# Patient Record
Sex: MALE | Race: WHITE | NOT HISPANIC OR LATINO
[De-identification: names, ages, dates, MRNs, and addresses within clinical notes are randomized per-mention and may not be internally consistent; named-entity substitution may affect disease eponyms.]

---

## 2021-03-22 PROBLEM — Z00.00 ENCOUNTER FOR PREVENTIVE HEALTH EXAMINATION: Status: ACTIVE | Noted: 2021-03-22

## 2021-03-24 ENCOUNTER — APPOINTMENT (OUTPATIENT)
Dept: COLORECTAL SURGERY | Facility: CLINIC | Age: 26
End: 2021-03-24
Payer: COMMERCIAL

## 2021-03-24 VITALS
HEART RATE: 80 BPM | RESPIRATION RATE: 12 BRPM | SYSTOLIC BLOOD PRESSURE: 120 MMHG | TEMPERATURE: 98.2 F | HEIGHT: 70 IN | BODY MASS INDEX: 24.34 KG/M2 | WEIGHT: 170 LBS | DIASTOLIC BLOOD PRESSURE: 74 MMHG

## 2021-03-24 DIAGNOSIS — Z83.2 FAMILY HISTORY OF DISEASES OF THE BLOOD AND BLOOD-FORMING ORGANS AND CERTAIN DISORDERS INVOLVING THE IMMUNE MECHANISM: ICD-10-CM

## 2021-03-24 DIAGNOSIS — Z87.09 PERSONAL HISTORY OF OTHER DISEASES OF THE RESPIRATORY SYSTEM: ICD-10-CM

## 2021-03-24 DIAGNOSIS — Z72.89 OTHER PROBLEMS RELATED TO LIFESTYLE: ICD-10-CM

## 2021-03-24 PROCEDURE — 99203 OFFICE O/P NEW LOW 30 MIN: CPT

## 2021-03-24 PROCEDURE — 99072 ADDL SUPL MATRL&STAF TM PHE: CPT

## 2021-03-24 NOTE — PHYSICAL EXAM
[Normal Breath Sounds] : Normal breath sounds [Normal Heart Sounds] : normal heart sounds [Normal Rate and Rhythm] : normal rate and rhythm [No Edema] : No edema [Alert] : alert [Oriented to Person] : oriented to person [Oriented to Place] : oriented to place [Oriented to Time] : oriented to time [Calm] : calm [de-identified] : flat +BS NT/ND [de-identified] : NC/AT [de-identified] : +ROM [de-identified] : intact

## 2021-03-24 NOTE — HISTORY OF PRESENT ILLNESS
[FreeTextEntry1] : 26yo WM developed infrequent blood on toilet tissue over past 4-5yrs.  Area would intermittently swell, drain, painful with sitting. \par \par December 2020 pt fell, landing on coccyx. Required evaluation of the area by PCP. At that time he was diagnosed with a pilonidal cyst. Has been seen by general surgeon.\par \par Presents for second opinion re mgmt/tx of pilonidal cyst.\par

## 2021-03-24 NOTE — ASSESSMENT
[FreeTextEntry1] : 25-year-old who presents for consultation regarding a pilonidal cyst.\par \par Over the past 4-5 years the patient has experienced intermittent bleeding seen in his undergarments. Recently he fell and had an increased amount of drainage from that area. He saw a general surgeon and was diagnosed with a pilonidal cyst.\par \par Patient was examined in the prone position. Inspection of the lower back/intergluteal region reveals obvious numerous midline pits. The area is slightly erythematous but there is no evidence of a pilonidal abscess currently. Inspection of the anorectal area is unremarkable. This does not appear to be an anal fistula.\par \par My preferred method of dealing with this certainly as a first attempt at treatment is pilonidal excision and marsupialization and allowance for healing by secondary intention. I generally teach a family member how to  appropriately pack the wound on a daily basis. I explained that it generally takes 4-6 or 5-7 weeks for complete healing.\par \par They will consider my explanation and recommendations and get back to me if they wish to proceed with surgical scheduling.

## 2021-03-24 NOTE — REVIEW OF SYSTEMS
[Negative] : Endocrine [Chest Pain] : no chest pain [Shortness Of Breath] : no shortness of breath [Easy Bleeding] : no tendency for easy bleeding [Easy Bruising] : no tendency for easy bruising [FreeTextEntry7] : constipation. BM every other day. Denies rectal bleeding

## 2021-03-26 ENCOUNTER — TRANSCRIPTION ENCOUNTER (OUTPATIENT)
Age: 26
End: 2021-03-26

## 2021-03-26 ENCOUNTER — OUTPATIENT (OUTPATIENT)
Dept: OUTPATIENT SERVICES | Facility: HOSPITAL | Age: 26
LOS: 1 days | End: 2021-03-26
Payer: COMMERCIAL

## 2021-03-26 VITALS
TEMPERATURE: 99 F | HEART RATE: 72 BPM | DIASTOLIC BLOOD PRESSURE: 72 MMHG | WEIGHT: 171.96 LBS | RESPIRATION RATE: 20 BRPM | SYSTOLIC BLOOD PRESSURE: 102 MMHG | HEIGHT: 69.69 IN | OXYGEN SATURATION: 100 %

## 2021-03-26 DIAGNOSIS — L05.91 PILONIDAL CYST WITHOUT ABSCESS: ICD-10-CM

## 2021-03-26 DIAGNOSIS — Z01.818 ENCOUNTER FOR OTHER PREPROCEDURAL EXAMINATION: ICD-10-CM

## 2021-03-26 DIAGNOSIS — Z98.890 OTHER SPECIFIED POSTPROCEDURAL STATES: Chronic | ICD-10-CM

## 2021-03-26 LAB
HCT VFR BLD CALC: 41.9 % — SIGNIFICANT CHANGE UP (ref 39–50)
HGB BLD-MCNC: 14.2 G/DL — SIGNIFICANT CHANGE UP (ref 13–17)
MCHC RBC-ENTMCNC: 27.7 PG — SIGNIFICANT CHANGE UP (ref 27–34)
MCHC RBC-ENTMCNC: 33.9 GM/DL — SIGNIFICANT CHANGE UP (ref 32–36)
MCV RBC AUTO: 81.7 FL — SIGNIFICANT CHANGE UP (ref 80–100)
NRBC # BLD: 0 /100 WBCS — SIGNIFICANT CHANGE UP (ref 0–0)
PLATELET # BLD AUTO: 208 K/UL — SIGNIFICANT CHANGE UP (ref 150–400)
RBC # BLD: 5.13 M/UL — SIGNIFICANT CHANGE UP (ref 4.2–5.8)
RBC # FLD: 11.5 % — SIGNIFICANT CHANGE UP (ref 10.3–14.5)
WBC # BLD: 5.41 K/UL — SIGNIFICANT CHANGE UP (ref 3.8–10.5)
WBC # FLD AUTO: 5.41 K/UL — SIGNIFICANT CHANGE UP (ref 3.8–10.5)

## 2021-03-26 PROCEDURE — G0463: CPT

## 2021-03-26 PROCEDURE — 85027 COMPLETE CBC AUTOMATED: CPT

## 2021-03-26 RX ORDER — SODIUM CHLORIDE 9 MG/ML
3 INJECTION INTRAMUSCULAR; INTRAVENOUS; SUBCUTANEOUS EVERY 8 HOURS
Refills: 0 | Status: DISCONTINUED | OUTPATIENT
Start: 2021-04-02 | End: 2021-04-16

## 2021-03-26 RX ORDER — LIDOCAINE HCL 20 MG/ML
0.2 VIAL (ML) INJECTION ONCE
Refills: 0 | Status: DISCONTINUED | OUTPATIENT
Start: 2021-04-02 | End: 2021-04-16

## 2021-03-26 RX ORDER — CHLORHEXIDINE GLUCONATE 213 G/1000ML
1 SOLUTION TOPICAL ONCE
Refills: 0 | Status: DISCONTINUED | OUTPATIENT
Start: 2021-04-02 | End: 2021-04-16

## 2021-03-26 NOTE — H&P PST ADULT - NSICDXPASTMEDICALHX_GEN_ALL_CORE_FT
PAST MEDICAL HISTORY:  Asthma due to seasonal allergies spring    Erectile dysfunction     Pilonidal cyst

## 2021-03-26 NOTE — H&P PST ADULT - HISTORY OF PRESENT ILLNESS
25 year old male with history of bleeding in undergarments for several years , was recently examined by PMD  found to have Pilondal cyst . Now coming for Excision of Pilonidal Cyst on 4/2/2021.     Denies Recent travel, Exposure or Covid symptoms  Covid test 3/30/2021

## 2021-03-29 PROBLEM — L05.91 PILONIDAL CYST WITHOUT ABSCESS: Chronic | Status: ACTIVE | Noted: 2021-03-26

## 2021-03-29 PROBLEM — N52.9 MALE ERECTILE DYSFUNCTION, UNSPECIFIED: Chronic | Status: ACTIVE | Noted: 2021-03-26

## 2021-03-29 PROBLEM — J45.909 UNSPECIFIED ASTHMA, UNCOMPLICATED: Chronic | Status: ACTIVE | Noted: 2021-03-26

## 2021-03-30 ENCOUNTER — OUTPATIENT (OUTPATIENT)
Dept: OUTPATIENT SERVICES | Facility: HOSPITAL | Age: 26
LOS: 1 days | End: 2021-03-30
Payer: COMMERCIAL

## 2021-03-30 DIAGNOSIS — Z98.890 OTHER SPECIFIED POSTPROCEDURAL STATES: Chronic | ICD-10-CM

## 2021-03-30 DIAGNOSIS — Z11.52 ENCOUNTER FOR SCREENING FOR COVID-19: ICD-10-CM

## 2021-03-30 LAB — SARS-COV-2 RNA SPEC QL NAA+PROBE: SIGNIFICANT CHANGE UP

## 2021-03-30 PROCEDURE — U0003: CPT

## 2021-03-30 PROCEDURE — C9803: CPT

## 2021-03-30 PROCEDURE — U0005: CPT

## 2021-04-01 ENCOUNTER — TRANSCRIPTION ENCOUNTER (OUTPATIENT)
Age: 26
End: 2021-04-01

## 2021-04-02 ENCOUNTER — RESULT REVIEW (OUTPATIENT)
Age: 26
End: 2021-04-02

## 2021-04-02 ENCOUNTER — APPOINTMENT (OUTPATIENT)
Dept: COLORECTAL SURGERY | Facility: HOSPITAL | Age: 26
End: 2021-04-02
Payer: COMMERCIAL

## 2021-04-02 ENCOUNTER — OUTPATIENT (OUTPATIENT)
Dept: OUTPATIENT SERVICES | Facility: HOSPITAL | Age: 26
LOS: 1 days | End: 2021-04-02
Payer: COMMERCIAL

## 2021-04-02 VITALS
HEART RATE: 67 BPM | DIASTOLIC BLOOD PRESSURE: 66 MMHG | SYSTOLIC BLOOD PRESSURE: 108 MMHG | RESPIRATION RATE: 20 BRPM | OXYGEN SATURATION: 100 % | TEMPERATURE: 99 F

## 2021-04-02 VITALS
DIASTOLIC BLOOD PRESSURE: 81 MMHG | SYSTOLIC BLOOD PRESSURE: 128 MMHG | HEIGHT: 69 IN | TEMPERATURE: 99 F | RESPIRATION RATE: 16 BRPM | WEIGHT: 171.96 LBS | OXYGEN SATURATION: 100 % | HEART RATE: 91 BPM

## 2021-04-02 DIAGNOSIS — L05.91 PILONIDAL CYST WITHOUT ABSCESS: ICD-10-CM

## 2021-04-02 DIAGNOSIS — Z98.890 OTHER SPECIFIED POSTPROCEDURAL STATES: Chronic | ICD-10-CM

## 2021-04-02 PROCEDURE — 11770 EXC PILONIDAL CYST SIMPLE: CPT

## 2021-04-02 PROCEDURE — 88304 TISSUE EXAM BY PATHOLOGIST: CPT

## 2021-04-02 PROCEDURE — 88304 TISSUE EXAM BY PATHOLOGIST: CPT | Mod: 26

## 2021-04-02 RX ORDER — TADALAFIL 10 MG/1
1 TABLET, FILM COATED ORAL
Qty: 0 | Refills: 0 | DISCHARGE

## 2021-04-02 RX ORDER — OXYCODONE HYDROCHLORIDE 5 MG/1
1 TABLET ORAL
Qty: 10 | Refills: 0
Start: 2021-04-02

## 2021-04-02 RX ORDER — SODIUM CHLORIDE 9 MG/ML
1000 INJECTION, SOLUTION INTRAVENOUS
Refills: 0 | Status: DISCONTINUED | OUTPATIENT
Start: 2021-04-02 | End: 2021-04-16

## 2021-04-02 NOTE — ASU DISCHARGE PLAN (ADULT/PEDIATRIC) - CALL YOUR DOCTOR IF YOU HAVE ANY OF THE FOLLOWING:
Bleeding that does not stop/Unable to urinate Bleeding that does not stop/Pain not relieved by Medications/Unable to urinate

## 2021-04-02 NOTE — PRE-ANESTHESIA EVALUATION ADULT - NSANTHPMHFT_GEN_ALL_CORE
Asthma - seasonal - last inhaler use >1 year ago  No intubations or ICU stays for asthma   No cardiac disease  Last marijuana use >1 month ago

## 2021-04-02 NOTE — ASU DISCHARGE PLAN (ADULT/PEDIATRIC) - ASU DC SPECIAL INSTRUCTIONSFT
Please take tylenol every 6 hours, and stagger with ibuprofen every 6 hours. This will allow you to alternate medications for more consistent pain control. For example: take a dose of tylenol at 8 am, then take a dose of ibuprofen at 11 am, then take a dose of tylenol at 2pm, and continue as needed. Please take tylenol every 6 hours, and stagger with ibuprofen every 6 hours. This will allow you to alternate medications for more consistent pain control. For example: take a dose of tylenol at 8 am, then take a dose of ibuprofen at 11 am, then take a dose of tylenol at 2pm, and continue as needed.    TYLENOL 1000mg IV given at 0756am. *********Next dose at 156pm**********

## 2021-04-05 ENCOUNTER — APPOINTMENT (OUTPATIENT)
Dept: COLORECTAL SURGERY | Facility: CLINIC | Age: 26
End: 2021-04-05
Payer: COMMERCIAL

## 2021-04-05 PROCEDURE — 99024 POSTOP FOLLOW-UP VISIT: CPT

## 2021-04-05 NOTE — HISTORY OF PRESENT ILLNESS
[FreeTextEntry1] : 24 yo M POD#3 s/p excision of pilonidal cyst. Accompanied by his parents for first dressing change.\par \par Patient has been doing well. Tolerating diet. Minimal use of oxycodone as needed. Reports some constipation with narcotics. Pain otherwise well controlled. No fevers, chills, or drainage from wound.\par \par Dressing removed, scant serous output, wound bed clean and dry, with pink granulation tissue.\par \par Instructed patient and parents on wound care. Patient is to shower daily and allow the gauze to be removed by the water. He should irrigate the wound with direct spray from the shower. After cleaning, patient will allow one of his parents to replace the gauze dressing. Gauze spread to single layer, and tucked all the way to the wound base using a cotton swab, with an additional clean piece of gauze on top, secured with tape.\par \par All questions were answered and patient and his parents both expressed understanding.\par \par Will return to office for repeat wound check in 1 week.

## 2021-04-06 LAB — SURGICAL PATHOLOGY STUDY: SIGNIFICANT CHANGE UP

## 2021-04-12 ENCOUNTER — APPOINTMENT (OUTPATIENT)
Dept: COLORECTAL SURGERY | Facility: CLINIC | Age: 26
End: 2021-04-12
Payer: COMMERCIAL

## 2021-04-12 PROCEDURE — 99024 POSTOP FOLLOW-UP VISIT: CPT

## 2021-04-12 NOTE — HISTORY OF PRESENT ILLNESS
[FreeTextEntry1] : 24 yo M seen for postoperative visit; POD#10 s/p excision of pilonidal cyst.\par \par Patient has been doing well, reports improvement in his pain. Father has been performing most of the dressing changes.\par \par Dressing removed, some fibrinous exudate at wound base, removed with dry gauze. Wound redressed with packing, tucked to wound base. Reiterated importance of getting dressing all the way to the base of the wound. Patient tolerated well.\par \par Will return to the office for repeat wound check, likely next Wednesday.

## 2021-04-21 ENCOUNTER — APPOINTMENT (OUTPATIENT)
Dept: COLORECTAL SURGERY | Facility: CLINIC | Age: 26
End: 2021-04-21
Payer: COMMERCIAL

## 2021-04-21 PROCEDURE — 17250 CHEM CAUT OF GRANLTJ TISSUE: CPT

## 2021-04-21 PROCEDURE — 99072 ADDL SUPL MATRL&STAF TM PHE: CPT

## 2021-04-21 NOTE — HISTORY OF PRESENT ILLNESS
[FreeTextEntry1] : Pleasant 24-year-old gentleman who presents for his third postoperative visit.\par \par The patient is almost 3 weeks status post excision of pilonidal cyst. He looks and feels well having no significant incisional pain.\par \par The patient was examined in the prone position. The existing dressing was taken down. The wound is significantly smaller having contracted and granulation tissue is present. The skin around the area was shaved and granulation tissue cauterized with silver nitrate. The wound was repacked with dry gauze.\par \par Patient is doing quite well and I will reevaluate the wound in 7-10 days.

## 2021-04-28 ENCOUNTER — APPOINTMENT (OUTPATIENT)
Dept: COLORECTAL SURGERY | Facility: CLINIC | Age: 26
End: 2021-04-28
Payer: COMMERCIAL

## 2021-04-28 PROCEDURE — 99072 ADDL SUPL MATRL&STAF TM PHE: CPT

## 2021-04-28 PROCEDURE — 17250 CHEM CAUT OF GRANLTJ TISSUE: CPT

## 2021-04-28 NOTE — HISTORY OF PRESENT ILLNESS
[FreeTextEntry1] : Pleasant 25-year-old gentleman who presents for routine followup visit. He is weeks status post excision of a pilonidal cyst.\par \par He continues to do well having minimal pain.\par \par The patient was examined in the prone position. The skin and the area was shaved. The base of the non-epithelialized excision site was cauterized with silver nitrate. A dressing was placed.\par \par I will see him in one week.

## 2021-05-05 ENCOUNTER — APPOINTMENT (OUTPATIENT)
Dept: COLORECTAL SURGERY | Facility: CLINIC | Age: 26
End: 2021-05-05
Payer: COMMERCIAL

## 2021-05-05 PROCEDURE — 17250 CHEM CAUT OF GRANLTJ TISSUE: CPT

## 2021-05-05 PROCEDURE — 99072 ADDL SUPL MATRL&STAF TM PHE: CPT

## 2021-05-05 NOTE — HISTORY OF PRESENT ILLNESS
[FreeTextEntry1] : 25-year-old gentleman who presents for followup visit. He is weeks status post excision of pilonidal cyst. He has no further pain.\par \par The patient was examined in the prone position. The existent dressing was taken down. There is still a small area less than 1 cm² which is not fully healed. This area was cauterized with silver nitrate. The skin surrounding the excision site was shaved. A dressing was replaced.\par \par I will see him in 7-10 days.

## 2021-05-06 ENCOUNTER — APPOINTMENT (OUTPATIENT)
Dept: PEDIATRIC ALLERGY IMMUNOLOGY | Facility: CLINIC | Age: 26
End: 2021-05-06
Payer: COMMERCIAL

## 2021-05-06 VITALS
OXYGEN SATURATION: 98 % | RESPIRATION RATE: 22 BRPM | HEIGHT: 70 IN | TEMPERATURE: 98.2 F | BODY MASS INDEX: 24.34 KG/M2 | WEIGHT: 170 LBS | HEART RATE: 93 BPM

## 2021-05-06 DIAGNOSIS — Z87.898 PERSONAL HISTORY OF OTHER SPECIFIED CONDITIONS: ICD-10-CM

## 2021-05-06 PROCEDURE — 99072 ADDL SUPL MATRL&STAF TM PHE: CPT

## 2021-05-06 PROCEDURE — 99203 OFFICE O/P NEW LOW 30 MIN: CPT

## 2021-05-06 RX ORDER — FEXOFENADINE HYDROCHLORIDE 180 MG/1
180 TABLET, FILM COATED ORAL EVERY MORNING
Refills: 0 | Status: ACTIVE | COMMUNITY

## 2021-05-06 RX ORDER — LEVOCETIRIZINE DIHYDROCHLORIDE 5 MG/1
5 TABLET ORAL AT BEDTIME
Refills: 0 | Status: ACTIVE | COMMUNITY

## 2021-05-06 RX ORDER — ALBUTEROL SULFATE 90 UG/1
108 (90 BASE) AEROSOL, METERED RESPIRATORY (INHALATION)
Refills: 0 | Status: ACTIVE | COMMUNITY

## 2021-05-06 RX ORDER — FLUTICASONE PROPIONATE 50 UG/1
50 SPRAY, METERED NASAL
Refills: 0 | Status: ACTIVE | COMMUNITY

## 2021-05-06 NOTE — HISTORY OF PRESENT ILLNESS
[de-identified] : 25 yr old with long history of allergic rhinitis last seen in 2002 for treatment. He now returns with complaints if increase BRIDGETT/SAC responsive to Allegra 180 mg AM, Xyzal 5 mg PM and Flonase 2s bid. In 2012 he ate ice cream containing pistachio - at the time he was really not eating any tree nuts but is OK with peanut. Within few minutes of eating ice cream he was noted to have diffuse hives requiring Benadryl. His pediatrician then sent ImmunoCap and he was positive to all tree nuts and was given an Epi Pen which he currently sometimes carries. He would like a current re-evaluation of the status of his tree nuts allergy and may be interested in challenges. he is OK with peanut

## 2021-05-06 NOTE — ASSESSMENT
[FreeTextEntry1] : 25 yr old with history of tree nut allergy - ?? pistachio - pt wants to know current status and may be interested in challenges??\par Hx BRIDGETT\par Pt took Allegra this AM - ImmunoCaps for airborne and tree nut allergens will be sent\par \par Pt has Epi pen

## 2021-05-06 NOTE — REASON FOR VISIT
[Initial Evaluation] : an initial evaluation of [Allergy Evaluation/ Skin Testing] : allergy evaluation and or skin testing [Runny Nose] : runny nose [Congestion] : congestion [Red Eyes] : red eyes [To Food] : allergy to food [Wheezing] : wheezing [Cough] : cough [Shortness of Breath] : shortness of breath [Eczema] : eczema

## 2021-05-06 NOTE — PHYSICAL EXAM
[Well Nourished] : well nourished [No Discharge] : no discharge [Normal TMs] : both tympanic membranes were normal [No Thrush] : no thrush [Boggy Nasal Turbinates] : no boggy and/or pale nasal turbinates [Posterior Pharyngeal Cobblestoning] : no posterior pharyngeal cobblestoning [No Neck Mass] : no neck mass was observed [Normal Rate and Effort] : normal respiratory rhythm and effort [Wheezing] : no wheezing was heard [Normal Rate] : heart rate was normal  [Normal S1, S2] : normal S1 and S2 [Normal Cervical Lymph Nodes] : cervical [Skin Intact] : skin intact  [Patches] : no patches

## 2021-05-06 NOTE — SOCIAL HISTORY
[Mother] : mother [Father] : father [Sister] : sister [College] : College [House] : [unfilled] lives in a house  [Radiator/Baseboard] : heating provided by radiator(s)/baseboard(s) [Central] : air conditioning provided by central unit [Dehumidifier] : uses a dehumidifier [Damp/Musty] : damp/musty [Living Area] : in living area [Dog] : dog [Soaps] : soaps [Single] : single [FreeTextEntry1] : 4 years [FreeTextEntry2] : [employer] [Humidifier] : does not use a humidifier [Dust Mite Covers] : does not have dust mite covers [Feather Pillows] : does not have feather pillows [Feather Comforter] : does not have a feather comforter [Bedroom] : not in the bedroom [Basement] : not in the basement [Smokers in Household] : there are no smokers in the home [de-identified] : dehumidifier in basement [de-identified] : 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

## 2021-05-06 NOTE — REVIEW OF SYSTEMS
[Eye Itching] : itchy eyes [Rhinorrhea] : rhinorrhea [Nasal Congestion] : nasal congestion [Sneezing] : sneezing [Nl] : Integumentary

## 2021-05-12 ENCOUNTER — APPOINTMENT (OUTPATIENT)
Dept: COLORECTAL SURGERY | Facility: CLINIC | Age: 26
End: 2021-05-12
Payer: COMMERCIAL

## 2021-05-12 PROCEDURE — 99072 ADDL SUPL MATRL&STAF TM PHE: CPT

## 2021-05-12 PROCEDURE — 17250 CHEM CAUT OF GRANLTJ TISSUE: CPT

## 2021-05-12 NOTE — HISTORY OF PRESENT ILLNESS
[FreeTextEntry1] : 22-year-old gentleman presents for followup visit. He is months status post pilonidal excision.\par \par The patient was examined in the prone position. The existing wound continues to contract and granulate. A heaped up granulation tissue was cauterized with silver nitrate. The wound was redressed.\par \par I will see him in 2 weeks.

## 2021-05-18 ENCOUNTER — LABORATORY RESULT (OUTPATIENT)
Age: 26
End: 2021-05-18

## 2021-05-21 ENCOUNTER — APPOINTMENT (OUTPATIENT)
Dept: COLORECTAL SURGERY | Facility: CLINIC | Age: 26
End: 2021-05-21
Payer: COMMERCIAL

## 2021-05-21 PROCEDURE — 99212 OFFICE O/P EST SF 10 MIN: CPT

## 2021-05-21 PROCEDURE — 99072 ADDL SUPL MATRL&STAF TM PHE: CPT

## 2021-05-24 ENCOUNTER — NON-APPOINTMENT (OUTPATIENT)
Age: 26
End: 2021-05-24

## 2021-05-24 LAB
A ALTERNATA IGE QN: 0.11 KUA/L
A FUMIGATUS IGE QN: 0.16 KUA/L
ALMOND IGE QN: 0.3 KUA/L
BERMUDA GRASS IGE QN: 0.22 KUA/L
BOXELDER IGE QN: 0.47 KUA/L
BRAZIL NUT IGE QN: <0.1 KUA/L
C HERBARUM IGE QN: 0.21 KUA/L
CALIF WALNUT IGE QN: 0.5 KUA/L
CASHEW NUT IGE QN: <0.1 KUA/L
CAT DANDER IGE QN: 0.39 KUA/L
CMN PIGWEED IGE QN: <0.1 KUA/L
COMMON RAGWEED IGE QN: 0.13 KUA/L
COTTONWOOD IGE QN: 0.11 KUA/L
D FARINAE IGE QN: 0.58 KUA/L
D PTERONYSS IGE QN: 0.3 KUA/L
DEPRECATED A ALTERNATA IGE RAST QL: NORMAL
DEPRECATED A FUMIGATUS IGE RAST QL: NORMAL
DEPRECATED ALMOND IGE RAST QL: NORMAL
DEPRECATED BERMUDA GRASS IGE RAST QL: NORMAL
DEPRECATED BOXELDER IGE RAST QL: 1
DEPRECATED BRAZIL NUT IGE RAST QL: 0
DEPRECATED C HERBARUM IGE RAST QL: NORMAL
DEPRECATED CASHEW NUT IGE RAST QL: 0
DEPRECATED CAT DANDER IGE RAST QL: 1
DEPRECATED COMMON PIGWEED IGE RAST QL: 0
DEPRECATED COMMON RAGWEED IGE RAST QL: NORMAL
DEPRECATED COTTONWOOD IGE RAST QL: NORMAL
DEPRECATED D FARINAE IGE RAST QL: 1
DEPRECATED D PTERONYSS IGE RAST QL: NORMAL
DEPRECATED DOG DANDER IGE RAST QL: 1
DEPRECATED GOOSEFOOT IGE RAST QL: NORMAL
DEPRECATED HAZELNUT IGE RAST QL: 3
DEPRECATED LONDON PLANE IGE RAST QL: 1
DEPRECATED MACADAMIA IGE RAST QL: NORMAL
DEPRECATED MOUSE URINE PROT IGE RAST QL: 0
DEPRECATED MUGWORT IGE RAST QL: NORMAL
DEPRECATED P NOTATUM IGE RAST QL: 0
DEPRECATED PECAN/HICK TREE IGE RAST QL: 0
DEPRECATED PINE NUT IGE RAST QL: 0
DEPRECATED PISTACHIO IGE RAST QL: 0.27 KUA/L
DEPRECATED RED CEDAR IGE RAST QL: NORMAL
DEPRECATED ROACH IGE RAST QL: NORMAL
DEPRECATED SHEEP SORREL IGE RAST QL: NORMAL
DEPRECATED SILVER BIRCH IGE RAST QL: 3
DEPRECATED TIMOTHY IGE RAST QL: 2
DEPRECATED WALNUT IGE RAST QL: NORMAL
DEPRECATED WHITE ASH IGE RAST QL: NORMAL
DEPRECATED WHITE OAK IGE RAST QL: 3
DOG DANDER IGE QN: 0.51 KUA/L
E ANA O3 STORAGE PROTEIN CASHEW (F443) CLASS: 0 (ref 0–?)
E ANA O3 STORAGE PROTEIN CASHEW (F443) CONC: <0.1 KUA/L
GOOSEFOOT IGE QN: 0.18 KUA/L
HAZELNUT IGE QN: 4.27 KUA/L
LONDON PLANE IGE QN: 0.61 KUA/L
MACADAMIA IGE QN: 0.18 KUA/L
MOUSE URINE PROT IGE QN: <0.1 KUA/L
MUGWORT IGE QN: 0.26 KUA/L
MULBERRY (T70) CLASS: 0
MULBERRY (T70) CONC: <0.1 KUA/L
P NOTATUM IGE QN: <0.1 KUA/L
PECAN/HICK TREE IGE QN: <0.1 KUA/L
PINE NUT IGE QN: <0.1 KUA/L
PISTACHIO IGE QN: NORMAL
R COR A1 PR-10 HAZELNUT (F428) CLASS: 3 (ref 0–?)
R COR A1 PR-10 HAZELNUT (F428) CONC: 6.86 KUA/L
R COR A14 HAZELNUT (F439) CLASS: 0 (ref 0–?)
R COR A14 HAZELNUT (F439) CONC: <0.1 KUA/L
R COR A8 LTP HAZELNUT (F425) CLASS: 0 (ref 0–?)
R COR A8 LTP HAZELNUT (F425) CONC: <0.1 KUA/L
R COR A9 HAZELNUT (F440) CLASS: 0 (ref 0–?)
R COR A9 HAZELNUT (F440) CONC: <0.1 KUA/L
R JUG R1 STORAGE PROTEIN WALNUT (F441) CLASS: 0 (ref 0–?)
R JUG R1 STORAGE PROTEIN WALNUT (F441) CONC: <0.1 KUA/L
R JUG R3 LPT WALNUT (F442) CLASS: ABNORMAL (ref 0–?)
R JUG R3 LPT WALNUT (F442) CONC: 0.17 KUA/L
RBER E1 STORAGE PROTEIN BRAZIL (F354) CL: 0 (ref 0–?)
RBER E1 STORAGE PROTEIN BRAZIL (F354) CONC: <0.1 KUA/L
RED CEDAR IGE QN: 0.2 KUA/L
ROACH IGE QN: 0.3 KUA/L
SHEEP SORREL IGE QN: 0.11 KUA/L
SILVER BIRCH IGE QN: 12.3 KUA/L
TIMOTHY IGE QN: 0.79 KUA/L
TREE ALLERG MIX1 IGE QL: 1
WALNUT IGE QN: 0.24 KUA/L
WHITE ASH IGE QN: 0.24 KUA/L
WHITE ELM IGE QN: 0.26 KUA/L
WHITE ELM IGE QN: NORMAL
WHITE OAK IGE QN: 8.33 KUA/L

## 2021-05-25 NOTE — HISTORY OF PRESENT ILLNESS
[FreeTextEntry1] : Pleasant 25-year-old gentleman who presents for routine followup visit.\par \par The patient is a few months status post excision of pilonidal cyst. He has no pain and drainage is minimal.\par \par Patient was examined in the prone position. The existing dressing was taken down. Any tape adhesive was removed. The wound actually looks quite good. There are 2 or 3 very small areas that have not fully re- epithelialized.\par \par I have advised that he use only a very small dressing and not use tape as his skin is severely irritated from the tape.\par \par I will reassess the wound in 2 weeks.

## 2021-05-26 ENCOUNTER — APPOINTMENT (OUTPATIENT)
Dept: COLORECTAL SURGERY | Facility: CLINIC | Age: 26
End: 2021-05-26

## 2021-06-04 ENCOUNTER — APPOINTMENT (OUTPATIENT)
Dept: COLORECTAL SURGERY | Facility: CLINIC | Age: 26
End: 2021-06-04
Payer: COMMERCIAL

## 2021-06-04 PROCEDURE — 99072 ADDL SUPL MATRL&STAF TM PHE: CPT

## 2021-06-04 PROCEDURE — 17250 CHEM CAUT OF GRANLTJ TISSUE: CPT

## 2021-06-04 NOTE — HISTORY OF PRESENT ILLNESS
[FreeTextEntry1] : 25 -year-old gentleman who returns for a routine followup visit. He is status post excision of pilonidal cyst.\par \par Patient states that he has no pain. He sees a very tiny amount of blood spotting on his dressing on a daily basis. This is markedly decreased.\par \par The patient was examined in the prone position. A small area of granulation tissue is present at the inferior aspect of the pilonidal cystectomy scar. There is one dimple in the skin present in the middle of the scar. The granulation tissue was cauterized with silver nitrate.\par \par I will reevaluate in 2 weeks.

## 2021-06-23 ENCOUNTER — APPOINTMENT (OUTPATIENT)
Dept: COLORECTAL SURGERY | Facility: CLINIC | Age: 26
End: 2021-06-23
Payer: COMMERCIAL

## 2021-06-23 PROCEDURE — 99072 ADDL SUPL MATRL&STAF TM PHE: CPT

## 2021-06-23 PROCEDURE — 17250 CHEM CAUT OF GRANLTJ TISSUE: CPT

## 2021-06-28 NOTE — HISTORY OF PRESENT ILLNESS
[FreeTextEntry1] : Pleasant 26 y/o gentleman who is months status post excision of pilonidal cyst. It seemed that the wound was completely healed. Recently he has noted a small amount of blood coming from the area. He denies any pain or temperature elevation.\par \par The patient was examined in the prone position. The patient has a very deep intergluteal cleft and is extremely hirsute. There is a small 3-4 mm area of granulation tissue. Some small hairs were removed. This was probed with a silver nitrate stick and cauterized. There is a small 4-5 mm tract at the base of this which was also cauterized.\par \par The situation is certainly frustrating for this individual. I have given him the name of a plastic surgeon for  consultation who in the past has helped with patients that have slow to heal pilonidal wounds. I will re-evaluate him after this consultation.

## 2021-07-22 ENCOUNTER — APPOINTMENT (OUTPATIENT)
Dept: PLASTIC SURGERY | Facility: CLINIC | Age: 26
End: 2021-07-22
Payer: COMMERCIAL

## 2021-07-22 VITALS — BODY MASS INDEX: 24.34 KG/M2 | HEIGHT: 70 IN | WEIGHT: 170 LBS

## 2021-07-22 PROCEDURE — 99203 OFFICE O/P NEW LOW 30 MIN: CPT

## 2021-07-23 NOTE — CONSULT LETTER
[Dear  ___] : Dear  [unfilled], [Consult Letter:] : I had the pleasure of evaluating your patient, [unfilled]. [Please see my note below.] : Please see my note below. [Consult Closing:] : Thank you very much for allowing me to participate in the care of this patient.  If you have any questions, please do not hesitate to contact me. [Sincerely,] : Sincerely, [FreeTextEntry3] : Xavier Awad MD, FACS

## 2021-07-29 ENCOUNTER — APPOINTMENT (OUTPATIENT)
Dept: PEDIATRIC ALLERGY IMMUNOLOGY | Facility: CLINIC | Age: 26
End: 2021-07-29
Payer: COMMERCIAL

## 2021-07-29 PROCEDURE — 95004 PERQ TESTS W/ALRGNC XTRCS: CPT

## 2021-07-29 PROCEDURE — 99072 ADDL SUPL MATRL&STAF TM PHE: CPT

## 2021-07-29 PROCEDURE — 99213 OFFICE O/P EST LOW 20 MIN: CPT | Mod: 25

## 2021-07-29 RX ORDER — EPINEPHRINE 0.3 MG/.3ML
0.3 INJECTION, SOLUTION INTRAMUSCULAR
Qty: 2 | Refills: 2 | Status: ACTIVE | COMMUNITY
Start: 2021-07-29 | End: 1900-01-01

## 2021-07-29 NOTE — HISTORY OF PRESENT ILLNESS
[de-identified] : 25 yr old with long history of allergic rhinitis last seen in 2002 for treatment. He now returns with complaints if increase BRIDGETT/SAC responsive to Allegra 180 mg AM, Xyzal 5 mg PM and Flonase 2s bid. In 2012 he ate ice cream containing pistachio - at the time he was really not eating any tree nuts but is OK with peanut. Within few minutes of eating ice cream he was noted to have diffuse hives requiring Benadryl. His pediatrician then sent ImmunoCap and he was positive to all tree nuts and was given an Epi Pen which he currently sometimes carries. He would like a current re-evaluation of the status of his tree nuts allergy and may be interested in challenges. he is OK with peanut\par \par He did well the remainder of the spring and now returns for eval of AR and tree nut allergy - he would like to know if it is only pistachio or he has other TN that he is sensitive to .

## 2021-07-29 NOTE — IMPRESSION
[Allergy Testing Dust Mite] : dust mites [Allergy Testing Mixed Feathers] : feathers [Allergy Testing Cockroach] : cockroach [Allergy Testing Dog] : dog [Allergy Testing Cat] : cat [Allergy Testing Trees] : trees [Allergy Testing Weeds] : weeds [Allergy Testing Grasses] : grasses [] : tree nuts

## 2021-07-29 NOTE — SOCIAL HISTORY
[Mother] : mother [Father] : father [Sister] : sister [College] : College [House] : [unfilled] lives in a house  [Radiator/Baseboard] : heating provided by radiator(s)/baseboard(s) [Central] : air conditioning provided by central unit [Dehumidifier] : uses a dehumidifier [Damp/Musty] : damp/musty [Living Area] : in living area [Dog] : dog [Soaps] : soaps [Single] : single [FreeTextEntry1] : 4 years [FreeTextEntry2] : [employer] [Humidifier] : does not use a humidifier [Dust Mite Covers] : does not have dust mite covers [Feather Pillows] : does not have feather pillows [Feather Comforter] : does not have a feather comforter [Bedroom] : not in the bedroom [Basement] : not in the basement [Smokers in Household] : there are no smokers in the home [de-identified] : dehumidifier in basement [de-identified] : 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

## 2021-07-29 NOTE — PHYSICAL EXAM
[Alert] : alert [Well Nourished] : well nourished [No Discharge] : no discharge [Normal TMs] : both tympanic membranes were normal [No Thrush] : no thrush [Boggy Nasal Turbinates] : no boggy and/or pale nasal turbinates [Posterior Pharyngeal Cobblestoning] : no posterior pharyngeal cobblestoning [No Neck Mass] : no neck mass was observed [Normal Rate and Effort] : normal respiratory rhythm and effort [Wheezing] : no wheezing was heard [Normal Rate] : heart rate was normal  [Normal Cervical Lymph Nodes] : cervical [Skin Intact] : skin intact

## 2021-07-29 NOTE — REASON FOR VISIT
[Routine Follow-Up] : a routine follow-up visit for [Allergy Evaluation/ Skin Testing] : allergy evaluation and or skin testing [Asthma] : asthma [Eczema] : eczema

## 2021-07-29 NOTE — ASSESSMENT
[FreeTextEntry1] : 25 yr old with BRIDGETT/SAC past few years - was much better after using Allegra, Xyzal and Flonase  - now off and feels OK. Hx pistachio reaction with hives\par \par Skin test today were negative to aeroallergens however pt clinical history is strongly suggestive of AR. OK to continue meds \par \par Hx pistachio allergy now with positive skin test to almond, hazelnut, pistachio, cashew, negative to walnut, pecan, brazil nut coconut\par Pt wants to avoid all tree nuts\par Will send in Rx for Auvi Q and Epi Pen

## 2021-08-20 ENCOUNTER — APPOINTMENT (OUTPATIENT)
Dept: PLASTIC SURGERY | Facility: CLINIC | Age: 26
End: 2021-08-20
Payer: COMMERCIAL

## 2021-08-20 DIAGNOSIS — L05.91 PILONIDAL CYST W/OUT ABSCESS: ICD-10-CM

## 2021-08-20 DIAGNOSIS — T14.8XXA OTHER INJURY OF UNSPECIFIED BODY REGION, INITIAL ENCOUNTER: ICD-10-CM

## 2021-08-20 PROCEDURE — 99212 OFFICE O/P EST SF 10 MIN: CPT

## 2021-08-20 NOTE — HISTORY OF PRESENT ILLNESS
[FreeTextEntry1] : Patient is here for follow-up of his pilonidal wound.  Patient reports no drainage from the area.  Patient does report that his grandmother passed recently, and has not had a chance to check the area.\par Denies any fevers or chills.

## 2021-11-03 ENCOUNTER — APPOINTMENT (OUTPATIENT)
Dept: COLORECTAL SURGERY | Facility: CLINIC | Age: 26
End: 2021-11-03
Payer: COMMERCIAL

## 2021-11-03 PROCEDURE — 17250 CHEM CAUT OF GRANLTJ TISSUE: CPT

## 2021-11-12 NOTE — HISTORY OF PRESENT ILLNESS
[FreeTextEntry1] : 26-year-old gentleman on whom I performed a pilonidal cystectomy months ago. The wound was slow to heal.  I referred him to plastic surgery.  Apparently the wound did heal. Last week he began experiencing increasing pain and some slight amount of drainage.\par \par The patient was examined in the prone position. There is only one small area which is approximately 1-2 mm in length and depth at the inferior most aspect of the pilonidal cystectomy incision which is open. This was cauterized with silver nitrate. The remainder of the area is healed and absolutely nontender, nonerythematous and nonfluctuant.\par \par I reassured the patient and his mom that I think the wound looks the best that I had seen it. I would advise he consider laser therapy to remove the hair in the area. I would do nothing further at this point.\par \par I told him that if his pain or drainage increases that he should return.

## 2022-01-05 ENCOUNTER — APPOINTMENT (OUTPATIENT)
Dept: COLORECTAL SURGERY | Facility: CLINIC | Age: 27
End: 2022-01-05
Payer: COMMERCIAL

## 2022-01-05 PROCEDURE — 99212 OFFICE O/P EST SF 10 MIN: CPT

## 2022-01-05 NOTE — PHYSICAL EXAM
[FreeTextEntry1] : Pt was examined in the prone position. He has a well healed midline scar at his cystectomy site without any surrounding erthyema/induration/fluctuance. No drainage. Unable to elicit tenderness on exam.

## 2022-01-05 NOTE — HISTORY OF PRESENT ILLNESS
[FreeTextEntry1] : 26M with a hx of a pilonidal cyst s/p pilonidal cystectomy months ago with healing by secondary intention. Pt experienced some delayed healing but ultimately had complete closure of the wound and currently denies any drainage. He presents to the office now with persistent left upper buttock pain that at time radiates laterally to the buttock but does not extend down the leg. He does sit for extended periods of time for work, not currently doing any heavy or strenuous exercise.

## 2022-04-15 ENCOUNTER — APPOINTMENT (OUTPATIENT)
Dept: COLORECTAL SURGERY | Facility: CLINIC | Age: 27
End: 2022-04-15
Payer: COMMERCIAL

## 2022-04-15 PROCEDURE — 99212 OFFICE O/P EST SF 10 MIN: CPT

## 2022-04-23 NOTE — HISTORY OF PRESENT ILLNESS
[FreeTextEntry1] : Pleasant 26-year-old gentleman who presents for followup visit.\par \par I performed a pilonidal cystectomy on him months ago. The wound fully healed and in the recent past he  experienced  some discomfort in the area and is concerned about recurrence.\par \par The patient was examined in the prone position. Examination of the intergluteal cleft and the site of his pilonidal cystectomy reveals no tenderness, erythema or fluctuance. Currently there is no evidence of recurrence.\par \par The patient and his father were reassured.

## 2023-01-05 ENCOUNTER — APPOINTMENT (OUTPATIENT)
Dept: PEDIATRIC ALLERGY IMMUNOLOGY | Facility: CLINIC | Age: 28
End: 2023-01-05
Payer: COMMERCIAL

## 2023-01-05 VITALS
WEIGHT: 165 LBS | OXYGEN SATURATION: 100 % | HEART RATE: 73 BPM | TEMPERATURE: 97.8 F | DIASTOLIC BLOOD PRESSURE: 73 MMHG | BODY MASS INDEX: 23.62 KG/M2 | SYSTOLIC BLOOD PRESSURE: 108 MMHG | HEIGHT: 70 IN

## 2023-01-05 DIAGNOSIS — Z91.018 ALLERGY TO OTHER FOODS: ICD-10-CM

## 2023-01-05 DIAGNOSIS — J30.9 ALLERGIC RHINITIS, UNSPECIFIED: ICD-10-CM

## 2023-01-05 PROCEDURE — 99213 OFFICE O/P EST LOW 20 MIN: CPT

## 2023-01-05 RX ORDER — EPINEPHRINE 0.3 MG/.3ML
0.3 INJECTION INTRAMUSCULAR
Qty: 2 | Refills: 3 | Status: ACTIVE | COMMUNITY
Start: 2021-07-29 | End: 1900-01-01

## 2023-01-05 RX ORDER — EPINEPHRINE 0.3 MG/.3ML
0.3 INJECTION INTRAMUSCULAR
Refills: 0 | Status: COMPLETED | COMMUNITY
End: 2023-01-05

## 2023-01-05 RX ORDER — SULFAMETHOXAZOLE AND TRIMETHOPRIM 800; 160 MG/1; MG/1
TABLET ORAL
Refills: 0 | Status: DISCONTINUED | COMMUNITY
End: 2023-01-05

## 2023-01-05 NOTE — HISTORY OF PRESENT ILLNESS
[Eczematous rashes] : eczematous rashes [Drug Allergies] : drug allergies [de-identified] : 27 yr old last seen 7/29/21 with long history of allergic rhinitis. In May 2021 he complained of increase BRIDGETT/SAC responsive to Allegra 180 mg AM, Xyzal 5 mg PM and Flonase 2s bid. \par \par Patient also recounted in 2012 he ate ice cream containing pistachio and within a few minutes noted to have diffuse hives requiring Benadryl. At the time he was really not eating any tree nuts but was OK with peanut. His pediatrician then sent ImmunoCap and he was positive to all tree nuts and was given an Epi Pen which sometimes carries. He was still OK with peanut\par \par ImmunoCap 5/18/21\par 1. Airborne allergens - positive for dust mite, cat, dog, grass and tree pollen \par 2. Tree nuts - all negative except for Hazelnut 4.27 but all cor a1 and walnut with neg total but jug r3 0.17\par \par Skin test 7/29/21 negative to aeroallergens\par [place] 8mm, hazelnut 6mm, cashew 6mm, pistachio 5mm. Negative to coconut, pecan, walnut, and brazil nut\par \par Patient preferred to avoid and carry Epi\par \par Patient now back and continues to avoid TN. He did have accidental ingestion of cashew the day after his last visit which was in a sauce that was not listed on the menu. He felt extreme nausea but denies vomiting. He took Benadryl and felt better. Since then no accidents but does need refill on Epi. He has no interest in repeating ImmunoCAP.\par \par As for his seasonal allergies this past spring he did well with his regimen of Allegra 180 mg AM, Xyzal 5 mg PM and Flonase 2s bid.

## 2023-01-05 NOTE — PHYSICAL EXAM
[Alert] : alert [Well Nourished] : well nourished [Healthy Appearance] : healthy appearance [No Acute Distress] : no acute distress [Well Developed] : well developed [Normal Voice/Communication] : normal voice communication [Normal Pupil & Iris Size/Symmetry] : normal pupil and iris size and symmetry [No Discharge] : no discharge [Sclera Not Icteric] : sclera not icteric [Normal Nasal Mucosa] : the nasal mucosa was normal [Normal Lips/Tongue] : the lips and tongue were normal [Normal Outer Ear/Nose] : the ears and nose were normal in appearance [No Nasal Discharge] : no nasal discharge [Normal Tonsils] : normal tonsils [No Thrush] : no thrush [No Neck Mass] : no neck mass was observed [Normal Rate and Effort] : normal respiratory rhythm and effort [Bilateral Audible Breath Sounds] : bilateral audible breath sounds [Normal Rate] : heart rate was normal  [Normal Cervical Lymph Nodes] : cervical [Skin Intact] : skin intact  [No Rash] : no rash [Normal Mood] : mood was normal [Normal Affect] : affect was normal [Judgment and Insight Age Appropriate] : judgement and insight is age appropriate [Alert, Awake, Oriented as Age-Appropriate] : alert, awake, oriented as age appropriate [de-identified] : cerumen impaction right ear

## 2023-01-05 NOTE — ASSESSMENT
[FreeTextEntry1] : 27 y.o male with hx of BRIDGETT and TN allergy presents for follow-up. \par \par No interest in checking TN allergy status so will hold off on ImmunoCAP\par \par Avoid TN and Keep epinephrine Autoinjector on hand\par \par Start Allegra 180 mg AM, Xyzal 5 mg PM and Flonase 2s bid in late March/early April to prepare for spring\par \par Follow-up in one year

## 2023-01-05 NOTE — CONSULT LETTER
[Dear  ___] : Dear  [unfilled], [Courtesy Letter:] : I had the pleasure of seeing your patient, [unfilled], in my office today. [Please see my note below.] : Please see my note below. [Sincerely,] : Sincerely, [FreeTextEntry3] : Liliam SHARIF\par

## 2023-01-05 NOTE — SOCIAL HISTORY
[House] : [unfilled] lives in a house  [Radiator/Baseboard] : heating provided by radiator(s)/baseboard(s) [Central] : air conditioning provided by central unit [Dehumidifier] : uses a dehumidifier [Damp/Musty] : damp/musty [Living Area] : in living area [Dog] : dog [Soaps] : soaps [Single] : single [Mother] : mother [Father] : father [Sister] : sister [College] : College [Humidifier] : does not use a humidifier [Dust Mite Covers] : does not have dust mite covers [Feather Pillows] : does not have feather pillows [Feather Comforter] : does not have a feather comforter [Bedroom] : not in the bedroom [Basement] : not in the basement [Smokers in Household] : there are no smokers in the home [de-identified] : dehumidifier in basement [de-identified] : 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